# Patient Record
Sex: FEMALE | NOT HISPANIC OR LATINO | Employment: FULL TIME | URBAN - METROPOLITAN AREA
[De-identification: names, ages, dates, MRNs, and addresses within clinical notes are randomized per-mention and may not be internally consistent; named-entity substitution may affect disease eponyms.]

---

## 2022-10-10 ENCOUNTER — TELEPHONE (OUTPATIENT)
Dept: OBSTETRICS AND GYNECOLOGY | Facility: CLINIC | Age: 47
End: 2022-10-10
Payer: COMMERCIAL

## 2022-10-10 NOTE — TELEPHONE ENCOUNTER
Called [facility] and scheduled patient's appointment.  Requested records be faxed to [phone] prior to appointment.

## 2022-10-10 NOTE — TELEPHONE ENCOUNTER
----- Message from Rachel Chan sent at 10/10/2022  3:51 PM CDT -----  Contact: [facility]  Coral Martinez is calling in regards to scheduling pt an appt.  She stated that pt needs to be seen to check her cervix. Please call her back at [phone]. Thanks KB